# Patient Record
Sex: FEMALE | Race: OTHER | HISPANIC OR LATINO
[De-identification: names, ages, dates, MRNs, and addresses within clinical notes are randomized per-mention and may not be internally consistent; named-entity substitution may affect disease eponyms.]

---

## 2022-08-05 PROBLEM — Z00.00 ENCOUNTER FOR PREVENTIVE HEALTH EXAMINATION: Status: ACTIVE | Noted: 2022-08-05

## 2022-08-19 ENCOUNTER — APPOINTMENT (OUTPATIENT)
Dept: GYNECOLOGIC ONCOLOGY | Facility: CLINIC | Age: 77
End: 2022-08-19

## 2022-08-19 VITALS
OXYGEN SATURATION: 98 % | SYSTOLIC BLOOD PRESSURE: 120 MMHG | TEMPERATURE: 97.8 F | HEIGHT: 60 IN | WEIGHT: 106 LBS | HEART RATE: 52 BPM | DIASTOLIC BLOOD PRESSURE: 56 MMHG | BODY MASS INDEX: 20.81 KG/M2

## 2022-08-19 PROCEDURE — 99205 OFFICE O/P NEW HI 60 MIN: CPT

## 2022-08-19 NOTE — CHIEF COMPLAINT
Please see PT DC summary for Huber Pena,  47. Thank you for this referral. Gail Davies, PT10/2018 [FreeTextEntry1] : New Consult\par \par 77yo referred by Dr. Olanescu for management of high grade CIN2. Pt had PMB and was referred to GYN. no current bleeding/discharge. no change bowel or urinary habits, wt changes, N/V.\par \par OBHX:  x 5, s/p lpsc BTL\par GYNHX: denies h/o abnormal paps, fibroid/ovarian cyst, LMP age 49\par PMHX: hypothyroid\par SX: left hip surgery 15y ago, lpsc BTL\par MED: synthroid, MVI\par ALL: NKDA\par SOCIAL: denies\par FAM: father: prostate cancer; brother: stomach cancer\par \par \par Last Mammogram:  normal\par Last pap: 2022 neg, HPV + high risk see above\par Last Colonoscopy: normal 3 years ago, getting it every 5 years.\par

## 2022-08-19 NOTE — HISTORY OF PRESENT ILLNESS
[FreeTextEntry1] : Problem:\par 1) MICHAELLE 2\par \par Previous Therapies:\par 1) PAP negative, HPV + E6/E7 4/21/22\par 1) ECC and Colpo 7/21/22\par      a) high grade MICHAELLE 2

## 2022-08-19 NOTE — PHYSICAL EXAM
[Abnormal] : Cervix: Abnormal [Normal] : Anus and perineum: Normal sphincter tone, no masses, no prolapse. [de-identified] : hypervascularity at 6 oclock, punctations and mosaicism at 4 oclock

## 2022-08-19 NOTE — ASSESSMENT
[FreeTextEntry1] : Colposcopy findings today consistent with high grade cervical dysplasia.\par \par With the aid of diagrams we reviewed the findings in detail.  We reviewed HPV its pathogenesis and the implications of an abnormal cervical cytology and the pathogenesis of dysplasia in detail.\par \par It has been reported that 40 to 58 percent of MICHAELLE 2 lesions will regress if left untreated, while 22 percent progress to MICHAELLE 3, and 5 percent progress to invasive cancer. ASCCP guidelines were reviewed with the patient. Excision procedure is recommended for both MICHAELLE 2 and CIN3. \par \par The risks and benefits of LEEP vs. CKC were discussed which include, but are not limited to: bleeding, infection, cervical stenosis, cervical insufficiency. Possibility of needing a repeat procedure or hysterectomy was also discussed. I also discussed the possibility that no abnormality will be seen on the LEEP specimen.\par \par I recommend LEEP under anesthesia in this case.\par \par [] Medical clearance\par [] COVID 19 pre-op\par [] HPV 16/18, 45\par [] Pathology review\par [] LEEP @ [facility]\par

## 2022-08-22 LAB
HPV 16 E6+E7 MRNA CVX QL NAA+PROBE: NOT DETECTED
HPV18+45 E6+E7 MRNA CVX QL NAA+PROBE: NOT DETECTED

## 2022-08-24 ENCOUNTER — OUTPATIENT (OUTPATIENT)
Dept: OUTPATIENT SERVICES | Facility: HOSPITAL | Age: 77
LOS: 1 days | End: 2022-08-24
Payer: MEDICAID

## 2022-08-24 ENCOUNTER — RESULT REVIEW (OUTPATIENT)
Age: 77
End: 2022-08-24

## 2022-08-24 DIAGNOSIS — N87.1 MODERATE CERVICAL DYSPLASIA: ICD-10-CM

## 2022-08-24 LAB — SURGICAL PATHOLOGY STUDY: SIGNIFICANT CHANGE UP

## 2022-08-24 PROCEDURE — 88321 CONSLTJ&REPRT SLD PREP ELSWR: CPT

## 2022-10-13 ENCOUNTER — NON-APPOINTMENT (OUTPATIENT)
Age: 77
End: 2022-10-13

## 2022-10-14 NOTE — ASU PATIENT PROFILE, ADULT - NSICDXPASTSURGICALHX_GEN_ALL_CORE_FT
PAST SURGICAL HISTORY:  H/O varicose vein stripping bilateral    History of hip surgery left    S/P cataract surgery left

## 2022-10-14 NOTE — ASU PATIENT PROFILE, ADULT - LANGUAGE ASSISTANCE NEEDED
Yes-Patient/Caregiver accepts free interpretation services... Day of surgery 10/16/2022 at 09:38 am patient speaks and understand limited English, declined official , prefer home attendant Naseem Park as /Yes-Patient/Caregiver accepts free interpretation services...

## 2022-10-14 NOTE — ASU PATIENT PROFILE, ADULT - NS PREOP UNDERSTANDS INFO
No solid food, dairy, candy or gum after midnight Sunday, water is allowed before 07:30am Monday. Pt. to come with photo ID/vaccination/insurance card. No jewelries/contact lens/valuables, dress in comfortable clothes. No smoking/alcohol consumption/recreational drug use Sunday. Escort must be 18yrs or older and must have photo ID. Address and call back was provided./yes

## 2022-10-14 NOTE — ASU PATIENT PROFILE, ADULT - FALL HARM RISK - UNIVERSAL INTERVENTIONS
Bed in lowest position, wheels locked, appropriate side rails in place/Call bell, personal items and telephone in reach/Instruct patient to call for assistance before getting out of bed or chair/Non-slip footwear when patient is out of bed/[place] to call system/Physically safe environment - no spills, clutter or unnecessary equipment/Purposeful Proactive Rounding/Room/bathroom lighting operational, light cord in reach

## 2022-10-16 ENCOUNTER — TRANSCRIPTION ENCOUNTER (OUTPATIENT)
Age: 77
End: 2022-10-16

## 2022-10-17 ENCOUNTER — OUTPATIENT (OUTPATIENT)
Dept: OUTPATIENT SERVICES | Facility: HOSPITAL | Age: 77
LOS: 1 days | Discharge: HOME | End: 2022-10-17

## 2022-10-17 ENCOUNTER — RESULT REVIEW (OUTPATIENT)
Age: 77
End: 2022-10-17

## 2022-10-17 ENCOUNTER — APPOINTMENT (OUTPATIENT)
Dept: GYNECOLOGIC ONCOLOGY | Facility: AMBULATORY SURGERY CENTER | Age: 77
End: 2022-10-17

## 2022-10-17 ENCOUNTER — TRANSCRIPTION ENCOUNTER (OUTPATIENT)
Age: 77
End: 2022-10-17

## 2022-10-17 VITALS — DIASTOLIC BLOOD PRESSURE: 56 MMHG | SYSTOLIC BLOOD PRESSURE: 119 MMHG | HEART RATE: 53 BPM

## 2022-10-17 VITALS
HEART RATE: 61 BPM | OXYGEN SATURATION: 100 % | SYSTOLIC BLOOD PRESSURE: 135 MMHG | DIASTOLIC BLOOD PRESSURE: 66 MMHG | RESPIRATION RATE: 16 BRPM | HEIGHT: 60 IN | TEMPERATURE: 98 F | WEIGHT: 105.82 LBS

## 2022-10-17 DIAGNOSIS — Z98.49 CATARACT EXTRACTION STATUS, UNSPECIFIED EYE: Chronic | ICD-10-CM

## 2022-10-17 DIAGNOSIS — Z98.890 OTHER SPECIFIED POSTPROCEDURAL STATES: Chronic | ICD-10-CM

## 2022-10-17 PROCEDURE — 88305 TISSUE EXAM BY PATHOLOGIST: CPT | Mod: 26

## 2022-10-17 PROCEDURE — 88307 TISSUE EXAM BY PATHOLOGIST: CPT | Mod: 26

## 2022-10-17 PROCEDURE — 57522 CONIZATION OF CERVIX: CPT

## 2022-10-17 RX ORDER — SODIUM CHLORIDE 9 MG/ML
1000 INJECTION, SOLUTION INTRAVENOUS
Refills: 0 | Status: DISCONTINUED | OUTPATIENT
Start: 2022-10-17 | End: 2022-10-17

## 2022-10-17 RX ORDER — FENTANYL CITRATE 50 UG/ML
25 INJECTION INTRAVENOUS
Refills: 0 | Status: DISCONTINUED | OUTPATIENT
Start: 2022-10-17 | End: 2022-10-17

## 2022-10-17 RX ORDER — ACETAMINOPHEN 500 MG
1 TABLET ORAL
Qty: 0 | Refills: 0 | DISCHARGE

## 2022-10-17 RX ORDER — PREGABALIN 225 MG/1
1 CAPSULE ORAL
Qty: 0 | Refills: 0 | DISCHARGE

## 2022-10-17 RX ORDER — ONDANSETRON 8 MG/1
4 TABLET, FILM COATED ORAL ONCE
Refills: 0 | Status: DISCONTINUED | OUTPATIENT
Start: 2022-10-17 | End: 2022-10-17

## 2022-10-17 RX ORDER — CHOLECALCIFEROL (VITAMIN D3) 125 MCG
1 CAPSULE ORAL
Qty: 0 | Refills: 0 | DISCHARGE

## 2022-10-17 RX ORDER — LEVOTHYROXINE SODIUM 125 MCG
1 TABLET ORAL
Qty: 0 | Refills: 0 | DISCHARGE

## 2022-10-17 RX ORDER — GABAPENTIN 400 MG/1
1 CAPSULE ORAL
Qty: 0 | Refills: 0 | DISCHARGE

## 2022-10-17 RX ORDER — ACETAMINOPHEN 500 MG
725 TABLET ORAL ONCE
Refills: 0 | Status: COMPLETED | OUTPATIENT
Start: 2022-10-17 | End: 2022-10-17

## 2022-10-17 RX ADMIN — SODIUM CHLORIDE 100 MILLILITER(S): 9 INJECTION, SOLUTION INTRAVENOUS at 10:57

## 2022-10-17 RX ADMIN — Medication 290 MILLIGRAM(S): at 12:01

## 2022-10-17 NOTE — BRIEF OPERATIVE NOTE - OPERATION/FINDINGS
LEEP procedure performed in a normal fashion... LEEP procedure performed in a normal fashion. Adhesion between the posterior cervix to the posterior fornix was released.  No complications. Excellent hemostasis. EBL 15cc.

## 2022-10-17 NOTE — ASU PREOP CHECKLIST - NOTHING BY MOUTH SINCE
Patient is up to date with labs and appointments. Medication refilled per standing orders.     17-Oct-2022 06:20

## 2022-10-17 NOTE — ASU DISCHARGE PLAN (ADULT/PEDIATRIC) - CALL YOUR DOCTOR IF YOU HAVE ANY OF THE FOLLOWING:
Bleeding that does not stop/Pain not relieved by Medications/Fever greater than (need to indicate Fahrenheit or Celsius)/Unable to urinate/Increased irritability or sluggishness

## 2022-10-17 NOTE — ASU DISCHARGE PLAN (ADULT/PEDIATRIC) - ASU DC SPECIAL INSTRUCTIONSFT
- Nothing in vagina - no intercourse, tampons, or douching until cleared by your doctor.   - Avoid swimming, tub baths, and heavy lifting until cleared by your doctor.   - Showering is ok.   - Continue oral pain medications as needed for pain.    - Follow up in office on 11/15 at 11:45 for your postoperative visit.    - Call the office sooner if you develop any fever, heavy bleeding, or severe pain.  Go to the closest emergency room for any of these symptoms if you are not able to contact your doctor. - Nothing in vagina - no intercourse, tampons, or douching until cleared by your doctor.   - Avoid swimming, tub baths, and heavy lifting until cleared by your doctor.   - Showering is ok.   - Continue oral pain medications as needed for pain.    - Follow up in office on 11/15 at 11:45 for your postoperative visit.    - Nothing in vagina - no intercourse, tampons, or douching until cleared by your doctor.   - Avoid swimming, tub baths, and heavy lifting until cleared by your doctor.   - Showering is ok.   - Continue oral pain medications as needed for pain.    - Follow up in office on 11/15 at 14:00 for your postoperative visit.    - Please expect some vaginal bleeding. If the bleeding is very heavy (saturating a pad in 1-2 hours) please contact your doctor or come to [facility] ED for evaluation.   - Call the office sooner if you develop any fever or severe pain.  Go to the closest emergency room for any of these symptoms if you are not able to contact your doctor.

## 2022-10-17 NOTE — ASU DISCHARGE PLAN (ADULT/PEDIATRIC) - CARE PROVIDER_API CALL
Janis Coombs)  Obstetrics and Gynecology  [address] [place] [address], [address], [address]  [place], [place] [facility]  Phone: [phone]  Fax: [phone]  Follow Up Time:

## 2022-10-18 LAB — SURGICAL PATHOLOGY STUDY: SIGNIFICANT CHANGE UP

## 2022-11-09 NOTE — ASU PATIENT PROFILE, ADULT - TEACHING/LEARNING OTHER LEARNERS
Wear Bipap with all sleep and naps.  Call [phone] if you are having problems with the bipap.     Take one puff of Trelegy inhaler at the same time each day.  You can take rescue inhaler (Albuterol) or nebulizer every 4 hours for shortness of breath    Be sure to follow up at the COPD clinic on 11/17      
home attendant

## 2022-11-15 ENCOUNTER — APPOINTMENT (OUTPATIENT)
Dept: GYNECOLOGIC ONCOLOGY | Facility: CLINIC | Age: 77
End: 2022-11-15

## 2022-11-15 VITALS
TEMPERATURE: 98.3 F | HEIGHT: 60 IN | WEIGHT: 105 LBS | SYSTOLIC BLOOD PRESSURE: 102 MMHG | OXYGEN SATURATION: 98 % | DIASTOLIC BLOOD PRESSURE: 64 MMHG | HEART RATE: 80 BPM | BODY MASS INDEX: 20.62 KG/M2

## 2022-11-15 PROBLEM — R41.3 OTHER AMNESIA: Chronic | Status: ACTIVE | Noted: 2022-10-14

## 2022-11-15 PROBLEM — E03.9 HYPOTHYROIDISM, UNSPECIFIED: Chronic | Status: ACTIVE | Noted: 2022-10-14

## 2022-11-15 PROBLEM — M81.0 AGE-RELATED OSTEOPOROSIS WITHOUT CURRENT PATHOLOGICAL FRACTURE: Chronic | Status: ACTIVE | Noted: 2022-10-14

## 2022-11-15 PROBLEM — K29.70 GASTRITIS, UNSPECIFIED, WITHOUT BLEEDING: Chronic | Status: ACTIVE | Noted: 2022-10-14

## 2022-11-15 PROCEDURE — 99024 POSTOP FOLLOW-UP VISIT: CPT

## 2022-11-15 NOTE — HISTORY OF PRESENT ILLNESS
[FreeTextEntry1] : Problem:\par 1) MICHAELLE 2\par \par Previous Therapies:\par 1) PAP negative, HPV + E6/E7 4/21/22\par 2) ECC and Colpo 7/21/22\par      a) high grade MICHAELLE 2\par 3) S/P LEEP \par      a) 1. Cervix, LEEP:\par - Cervical intraepithelial neoplasia (MICHAELLE) II-III\par - Endocervical and stromal resection margins negative for dysplasia\par      b) Endocervix, curettage:\par - Inadequate tissue for histologic study\par - See comment

## 2022-11-15 NOTE — PHYSICAL EXAM
[Abnormal] : Cervix: Abnormal [Normal] : Anus and perineum: Normal sphincter tone, no masses, no prolapse. [de-identified] : cervix with raw surface, had been healing against the vaginal wall. no evidence of infection or discharge [de-identified] : ECC done today.

## 2022-11-15 NOTE — REASON FOR VISIT
[FreeTextEntry1] : 1 month post op \par \par Reports she is feeling very stressed and anxious, but otherwise no complaints. Denies vaginal bleeding, discharge or pain.

## 2022-11-15 NOTE — ASSESSMENT
[FreeTextEntry1] : Patient with delayed healing. Request that she follow up in 2 weeks before leaving for her trip. ECC taken today.\par \par [] ECC\par [] F/U in 2 weeks

## 2022-11-15 NOTE — CHIEF COMPLAINT
[FreeTextEntry1] : New Consult\par \par 77yo referred by Dr. Olanescu for management of high grade CIN2. Pt had PMB and was referred to GYN. no current bleeding/discharge. no change bowel or urinary habits, wt changes, N/V.\par \par OBHX:  x 5, s/p lpsc BTL\par GYNHX: denies h/o abnormal paps, fibroid/ovarian cyst, LMP age 49\par PMHX: hypothyroid\par SX: left hip surgery 15y ago, lpsc BTL\par MED: synthroid, MVI\par ALL: NKDA\par SOCIAL: denies\par FAM: father: prostate cancer; brother: stomach cancer\par \par \par Last Mammogram:  normal\par Last pap: 2022 neg, HPV + high risk see above\par Last Colonoscopy: normal 3 years ago, getting it every 5 years.\par

## 2022-11-28 ENCOUNTER — NON-APPOINTMENT (OUTPATIENT)
Age: 77
End: 2022-11-28

## 2022-11-29 ENCOUNTER — APPOINTMENT (OUTPATIENT)
Dept: GYNECOLOGIC ONCOLOGY | Facility: CLINIC | Age: 77
End: 2022-11-29

## 2023-04-20 ENCOUNTER — NON-APPOINTMENT (OUTPATIENT)
Age: 78
End: 2023-04-20

## 2023-05-30 ENCOUNTER — APPOINTMENT (OUTPATIENT)
Dept: GYNECOLOGIC ONCOLOGY | Facility: CLINIC | Age: 78
End: 2023-05-30
Payer: MEDICAID

## 2023-05-30 VITALS
BODY MASS INDEX: 20.81 KG/M2 | OXYGEN SATURATION: 97 % | HEART RATE: 68 BPM | DIASTOLIC BLOOD PRESSURE: 68 MMHG | WEIGHT: 106 LBS | HEIGHT: 60 IN | SYSTOLIC BLOOD PRESSURE: 107 MMHG | TEMPERATURE: 98 F

## 2023-05-30 DIAGNOSIS — N87.1 MODERATE CERVICAL DYSPLASIA: ICD-10-CM

## 2023-05-30 PROCEDURE — 99214 OFFICE O/P EST MOD 30 MIN: CPT

## 2023-05-30 NOTE — HISTORY OF PRESENT ILLNESS
[FreeTextEntry1] : Problem:\par 1) MICHAELLE 2\par \par Previous Therapies:\par 1) PAP negative, HPV + E6/E7 4/21/22\par 2) ECC and Colpo 7/21/22\par      a) high grade MICHAELLE 2\par 3) S/P LEEP 10/17/22\par      a) 1. Cervix, LEEP:\par - Cervical intraepithelial neoplasia (MICHAELLE) II-III\par - Endocervical and stromal resection margins negative for dysplasia\par      b) Endocervix, curettage:\par - Inadequate tissue for histologic study\par - See comment\par 4) ECC 11/15/22\par     a) Endocervix, curettage:\par      - Endocervical mucosa with pigmented macrophages and giant cells

## 2023-05-30 NOTE — REASON FOR VISIT
[FreeTextEntry1] : 78 yo s/p LEEP in October. She missed her post op check and scheduled for follow up now. \par \par Pathology results reviewed. Negative margins with negative ECC. ASCCP guidelines reviewed. Upet stomach x 2 weeks, no appetite. Saw PCP, for stomach and got an abdominal US that was normal. pelvic pain x 1 month primarily at night. denies n/v/vb/abnormal discharge. \par \par \par Maintenance:\par Mammo: > 1 year, due\par Colonoscopy:  4-5 years ago- would like referral for GI

## 2023-05-30 NOTE — ASSESSMENT
[FreeTextEntry1] : Recommendation is for PAP 1 year after LEEP.\par \par She will follow up with Dr. Olanescu.\par \par Patient encouraged to follow up with her PCP for health maintenance referral.\par \par Reconsult as needed.

## 2023-05-30 NOTE — PHYSICAL EXAM
[Normal] : Anus and perineum: Normal sphincter tone, no masses, no prolapse. [de-identified] : ECC done today.

## 2023-11-01 NOTE — BRIEF OPERATIVE NOTE - SPECIMENS
Occupational Therapy Refusal    Patient Name: Joseph Mcgarry  YZTCT'C Date: 11/1/2023 11/01/23 [id number]   Note Type   Note Type Cancelled Session   Cancel Reasons Refusal  (Attempted to see pt for OT treatment session however pt adamantly refusing participation in therapy at this time stating she is tired. Despite max encouragment pt continues to refuse.  Pt reports she is willing to participate in therapy tomorrow.)     Laina Negro, OTD, OTR/L  [place] License [id number]  [address] [place], [place] [id number] ANABELLAP, ECC

## (undated) DEVICE — POSITIONER FOAM EGG CRATE ULNAR 2PCS (PINK)

## (undated) DEVICE — PACK D&C

## (undated) DEVICE — TUBING SUCTION 20FT

## (undated) DEVICE — APPLICATOR COTTON TIP 6"

## (undated) DEVICE — SLV COMPRESSION KNEE MED

## (undated) DEVICE — WARMING BLANKET UPPER ADULT

## (undated) DEVICE — SYR LUER LOK 10CC

## (undated) DEVICE — DRSG PAD SANITARY OB

## (undated) DEVICE — ELCTR LOOP FOR LLETZ 20MM X 12MM

## (undated) DEVICE — BLADE SCALPEL SAFETY #11 WITH PLASTIC GREEN HANDLE

## (undated) DEVICE — SUT SILK 2-0 18" FS

## (undated) DEVICE — TUBING RAPIDVAC SMOKE EVACUATOR .25" X 10FT

## (undated) DEVICE — SUCTION YANKAUER FLEXIBE HI CAPACITY NO VENT

## (undated) DEVICE — GLV 6.5 PROTEXIS (WHITE)

## (undated) DEVICE — POSITIONER STRAP ARMBOARD 1.5X32" DISP

## (undated) DEVICE — ELCTR BALL LLETZ LG 5MM

## (undated) DEVICE — ELCTR BOVIE PENCIL BLADE 10FT

## (undated) DEVICE — ELCTR BOVIE BLADE 3/4" EXTENDED LENGTH 6"